# Patient Record
Sex: MALE | Race: BLACK OR AFRICAN AMERICAN | NOT HISPANIC OR LATINO | Employment: FULL TIME | URBAN - METROPOLITAN AREA
[De-identification: names, ages, dates, MRNs, and addresses within clinical notes are randomized per-mention and may not be internally consistent; named-entity substitution may affect disease eponyms.]

---

## 2017-05-28 ENCOUNTER — HOSPITAL ENCOUNTER (EMERGENCY)
Facility: MEDICAL CENTER | Age: 27
End: 2017-05-28
Attending: EMERGENCY MEDICINE
Payer: MEDICAID

## 2017-05-28 ENCOUNTER — APPOINTMENT (OUTPATIENT)
Dept: RADIOLOGY | Facility: MEDICAL CENTER | Age: 27
End: 2017-05-28
Attending: EMERGENCY MEDICINE
Payer: MEDICAID

## 2017-05-28 VITALS
TEMPERATURE: 98 F | HEIGHT: 71 IN | HEART RATE: 87 BPM | DIASTOLIC BLOOD PRESSURE: 61 MMHG | WEIGHT: 170.86 LBS | SYSTOLIC BLOOD PRESSURE: 128 MMHG | BODY MASS INDEX: 23.92 KG/M2 | OXYGEN SATURATION: 99 % | RESPIRATION RATE: 16 BRPM

## 2017-05-28 DIAGNOSIS — S63.642A SPRAIN OF METACARPOPHALANGEAL (MCP) JOINT OF LEFT THUMB, INITIAL ENCOUNTER: ICD-10-CM

## 2017-05-28 PROCEDURE — 99284 EMERGENCY DEPT VISIT MOD MDM: CPT

## 2017-05-28 PROCEDURE — 700102 HCHG RX REV CODE 250 W/ 637 OVERRIDE(OP): Performed by: EMERGENCY MEDICINE

## 2017-05-28 PROCEDURE — A9270 NON-COVERED ITEM OR SERVICE: HCPCS | Performed by: EMERGENCY MEDICINE

## 2017-05-28 PROCEDURE — 73130 X-RAY EXAM OF HAND: CPT | Mod: LT

## 2017-05-28 RX ORDER — IBUPROFEN 600 MG/1
600 TABLET ORAL ONCE
Status: COMPLETED | OUTPATIENT
Start: 2017-05-28 | End: 2017-05-28

## 2017-05-28 RX ADMIN — IBUPROFEN 600 MG: 600 TABLET, FILM COATED ORAL at 10:09

## 2017-05-28 ASSESSMENT — PAIN SCALES - GENERAL: PAINLEVEL_OUTOF10: 7

## 2017-05-28 ASSESSMENT — LIFESTYLE VARIABLES: DO YOU DRINK ALCOHOL: NO

## 2017-05-28 NOTE — ED AVS SNAPSHOT
Home Care Instructions                                                                                                                Alejo Valdivia   MRN: [id number]    Department:  [facility], Emergency Dept   Date of Visit:  5/28/2017            [facility], Emergency Dept    [address] [place] [id number]    Phone:  [phone]      You were seen by     Thomas Moreno M.D.      Your Diagnosis Was     Sprain of metacarpophalangeal (MCP) joint of left thumb, initial encounter     J75.596Z       These are the medications you received during your hospitalization from 05/28/2017 0936 to 05/28/2017 1115     Date/Time Order Dose Route Action    05/28/2017 1009 ibuprofen (MOTRIN) tablet 600 mg 600 mg Oral Given      Follow-up Information     1. Follow up with [facility], Emergency Dept.    Specialty:  Emergency Medicine    Why:  If symptoms worsen    Contact information    [address] [place] [address]  [place] [address] [phone]        2. Follow up with [facility].    Contact information    [place] [address] [phone]      Medication Information     Review all of your home medications and newly ordered medications with your primary doctor and/or pharmacist as soon as possible. Follow medication instructions as directed by your doctor and/or pharmacist.     Please keep your complete medication list with you and share with your physician. Update the information when medications are discontinued, doses are changed, or new medications (including over-the-counter products) are added; and carry medication information at all times in the event of emergency situations.               Medication List      Notice     You have not been prescribed any medications.            Procedures and tests performed during your visit     DX-HAND 3+ LEFT        Discharge Instructions       Please follow-up with your  primary care provider for blood pressure management.      Thumb Sprain  A thumb sprain is an injury to one of the strong bands of tissue (ligaments) that connect the bones in your thumb. The ligament can be stretched too much or it can tear. A tear can be either partial or complete. The severity of the sprain depends on how much of the ligament was damaged or torn.  CAUSES  A thumb sprain is often caused by a fall or an accident. If you extend your hands to catch an object or to protect yourself, the force of the impact can cause your ligament to stretch too much. This excess tension can also cause your ligament to tear.  RISK FACTORS  This injury is more likely to occur in people who play:  · Sports that involve a greater risk of falling, such as skiing.  · Sports that involve catching an object, such as basketball.  SYMPTOMS  Symptoms of this condition include:  · Loss of motion in your thumb.  · Bruising.  · Tenderness.  · Swelling.  DIAGNOSIS  This condition is diagnosed with a medical history and physical exam. You may also have an X-ray of your thumb.  TREATMENT  Treatment varies depending on the severity of your sprain. If your ligament is overstretched or partially torn, treatment usually involves keeping your thumb in a fixed position (immobilization) for a period of time. To help you do this, your health care provider will apply a bandage, cast, or splint to keep your thumb from moving until it heals.  If your ligament is fully torn, you may need surgery to reconnect the ligament to the bone. After surgery, a cast or splint will be applied and will need to stay on your thumb while it heals.  Your health care provider may also suggest exercises or physical therapy to strengthen your thumb.  HOME CARE INSTRUCTIONS  If You Have a Cast:  · Do not stick anything inside the cast to scratch your skin. Doing that increases your risk of infection.  · Check the skin around the cast every day. Report any concerns to  your health care provider. You may put lotion on dry skin around the edges of the cast. Do not apply lotion to the skin underneath the cast.  · Keep the cast clean and dry.  If You Have a Splint:  · Wear it as directed by your health care provider. Remove it only as directed by your health care provider.  · Loosen the splint if your fingers become numb and tingle, or if they turn cold and blue.  · Keep the splint clean and dry.  Bathing  · Cover the bandage, cast, or splint with a watertight plastic bag to protect it from water while you take a bath or a shower. Do not let the bandage, cast, or splint get wet.  Managing Pain, Stiffness, and Swelling   · If directed, apply ice to the injured area (unless you have a cast):  ¨ Put ice in a plastic bag.  ¨ Place a towel between your skin and the bag.  ¨ Leave the ice on for 20 minutes, 2-3 times per day.  · Move your fingers often to avoid stiffness and to lessen swelling.  · Raise (elevate) the injured area above the level of your heart while you are sitting or lying down.  Driving  · Do not drive or operate heavy machinery while taking pain medicine.  · Do not drive while wearing a cast or splint on a hand that you use for driving.  General Instructions  · Do not put pressure on any part of your cast or splint until it is fully hardened. This may take several hours.  · Take medicines only as directed by your health care provider. These include over-the-counter medicines and prescription medicines.  · Keep all follow-up visits as directed by your health care provider. This is important.  · Do any exercise or physical therapy as directed by your health care provider.  · Do not wear rings on your injured thumb.  SEEK MEDICAL CARE IF:  · Your pain is not controlled with medicine.  · Your bruising or swelling gets worse.  · Your cast or splint is damaged.  SEEK IMMEDIATE MEDICAL CARE IF:  · Your thumb is numb or blue.  · Your thumb feels colder than normal.     This  information is not intended to replace advice given to you by your health care provider. Make sure you discuss any questions you have with your health care provider.     Document Released: 01/25/2006 Document Revised: 05/03/2016 Document Reviewed: 09/29/2015  Elsevier Interactive Patient Education ©2016 [employer]            Patient Information     Patient Information    Following emergency treatment: all patient requiring follow-up care must return either to a private physician or a clinic if your condition worsens before you are able to obtain further medical attention, please return to the emergency room.     Billing Information    At [facility], we work to make the billing process streamlined for our patients.  Our Representatives are here to answer any questions you may have regarding your hospital bill.  If you have insurance coverage and have supplied your insurance information to us, we will submit a claim to your insurer on your behalf.  Should you have any questions regarding your bill, we can be reached online or by phone as follows:  Online: You are able pay your bills online or live chat with our representatives about any billing questions you may have. We are here to help Monday - Friday from 8:00am to 7:30pm and 9:00am - 12:00pm on Saturdays.  Please visit [email or web address]  for more information.   Phone:  [phone] or [phone]    Please note that your emergency physician, surgeon, pathologist, radiologist, anesthesiologist, and other specialists are not employed by [facility] and will therefore bill separately for their services.  Please contact them directly for any questions concerning their bills at the numbers below:     Emergency Physician Services:  [phone]  [facility]:  [phone]  [facility]:  [phone]  [facility]:  [phone]    1. Your final bill may vary from the amount quoted  upon discharge if all procedures are not complete at that time, or if your doctor has additional procedures of which we are not aware. You will receive an additional bill if you return to the Emergency Department at [facility] for suture removal regardless of the facility of which the sutures were placed.     2. Please arrange for settlement of this account at the emergency registration.    3. All self-pay accounts are due in full at the time of treatment.  If you are unable to meet this obligation then payment is expected within 4-5 days.     4. If you have had radiology studies (CT, X-ray, Ultrasound, MRI), you have received a preliminary result during your emergency department visit. Please contact the radiology department [phone] to receive a copy of your final result. Please discuss the Final result with your primary physician or with the follow up physician provided.     Crisis Hotline:  [place] Crisis Hotline:  6-011-DCBBMAM or 1-935.731.4615  [place] Crisis Hotline:    1-451.587.5108 or 010-382-7240         ED Discharge Follow Up Questions    1. In order to provide you with very good care, we would like to follow up with a phone call in the next few days.  May we have your permission to contact you?     YES /  NO    2. What is the best phone number to call you? (       )_____-__________    3. What is the best time to call you?      Morning  /  Afternoon  /  Evening                   Patient Signature:  ____________________________________________________________    Date:  ____________________________________________________________

## 2017-05-28 NOTE — ED AVS SNAPSHOT
[employer] Access Code: [email or web address]  Expires: 6/27/2017 11:14 AM    [employer]  A secure, online tool to manage your health information     "[employer]’s [employer]® is a secure, online tool that connects you to your personalized health information from the privacy of your home -- day or night - making it very easy for you to manage your healthcare. Once the activation process is completed, you can even access your medical information using the [employer] red, which is available for free in the Apple Red store or Google Play store.     [employer] provides the following levels of access (as shown below):   My Chart Features   [facility] Primary Care Doctor [facility]  Specialists [facility]  Urgent  Care Non-[facility]  Primary Care  Doctor   Email your healthcare team securely and privately 24/7 X X X X   Manage appointments: schedule your next appointment; view details of past/upcoming appointments X      Request prescription refills. X      View recent personal medical records, including lab and immunizations X X X X   View health record, including health history, allergies, medications X X X X   Read reports about your outpatient visits, procedures, consult and ER notes X X X X   See your discharge summary, which is a recap of your hospital and/or ER visit that includes your diagnosis, lab results, and care plan. X X       How to register for [employer]:  1. Go to  [email or web address].  2. Click on the Sign Up Now box, which takes you to the New Member Sign Up page. You will need to provide the following information:  a. Enter your [employer] Access Code exactly as it appears at the top of this page. (You will not need to use this code after you’ve completed the sign-up process. If you do not sign up before the expiration date, you must request a new code.)   b. Enter your date of birth.   c. Enter your home email address.   d. Click Submit, and follow the next screen’s instructions.  3. Create a [employer] ID. This will be your [employer]  login ID and cannot be changed, so think of one that is secure and easy to remember.  4. Create a [employer] password. You can change your password at any time.  5. Enter your Password Reset Question and Answer. This can be used at a later time if you forget your password.   6. Enter your e-mail address. This allows you to receive e-mail notifications when new information is available in [employer].  7. Click Sign Up. You can now view your health information.    For assistance activating your [employer] account, call [phone]

## 2017-05-28 NOTE — ED AVS SNAPSHOT
5/28/2017    Alejo Valdivia  [address]  [place] [address]    Dear Alejo:    [facility] wants to ensure your discharge home is safe and you or your loved ones have had all of your questions answered regarding your care after you leave the hospital.    Below is a list of resources and contact information should you have any questions regarding your hospital stay, follow-up instructions, or active medical symptoms.    Questions or Concerns Regarding… Contact   Medical Questions Related to Your Discharge  (7 days a week, 8am-5pm) Contact a Nurse Care Coordinator   [phone]   Medical Questions Not Related to Your Discharge  (24 hours a day / 7 days a week)  Contact the Nurse Health Line   [phone]    Medications or Discharge Instructions Refer to your discharge packet   or contact your [facility] Primary Care Provider   [phone]   Follow-up Appointment(s) Schedule your appointment via [facility]   or contact Scheduling [phone]   Billing Review your statement via [employer]  or contact Billing [phone]   Medical Records Review your records via [employer]   or contact Medical Records [phone]     You may receive a telephone call within two days of discharge. This call is to make certain you understand your discharge instructions and have the opportunity to have any questions answered. You can also easily access your medical information, test results and upcoming appointments via the [employer] free online health management tool. You can learn more and sign up at [employer]. For assistance setting up your [employer] account, please call [phone].    Once again, we want to ensure your discharge home is safe and that you have a clear understanding of any next steps in your care. If you have any questions or concerns, please do not hesitate to contact us, we are here for you. Thank you for choosing [facility] for your healthcare needs.    Sincerely,    Your [facility] Healthcare Team

## 2017-05-28 NOTE — DISCHARGE INSTRUCTIONS
Please follow-up with your primary care provider for blood pressure management.      Thumb Sprain  A thumb sprain is an injury to one of the strong bands of tissue (ligaments) that connect the bones in your thumb. The ligament can be stretched too much or it can tear. A tear can be either partial or complete. The severity of the sprain depends on how much of the ligament was damaged or torn.  CAUSES  A thumb sprain is often caused by a fall or an accident. If you extend your hands to catch an object or to protect yourself, the force of the impact can cause your ligament to stretch too much. This excess tension can also cause your ligament to tear.  RISK FACTORS  This injury is more likely to occur in people who play:  · Sports that involve a greater risk of falling, such as skiing.  · Sports that involve catching an object, such as basketball.  SYMPTOMS  Symptoms of this condition include:  · Loss of motion in your thumb.  · Bruising.  · Tenderness.  · Swelling.  DIAGNOSIS  This condition is diagnosed with a medical history and physical exam. You may also have an X-ray of your thumb.  TREATMENT  Treatment varies depending on the severity of your sprain. If your ligament is overstretched or partially torn, treatment usually involves keeping your thumb in a fixed position (immobilization) for a period of time. To help you do this, your health care provider will apply a bandage, cast, or splint to keep your thumb from moving until it heals.  If your ligament is fully torn, you may need surgery to reconnect the ligament to the bone. After surgery, a cast or splint will be applied and will need to stay on your thumb while it heals.  Your health care provider may also suggest exercises or physical therapy to strengthen your thumb.  HOME CARE INSTRUCTIONS  If You Have a Cast:  · Do not stick anything inside the cast to scratch your skin. Doing that increases your risk of infection.  · Check the skin around the cast every  day. Report any concerns to your health care provider. You may put lotion on dry skin around the edges of the cast. Do not apply lotion to the skin underneath the cast.  · Keep the cast clean and dry.  If You Have a Splint:  · Wear it as directed by your health care provider. Remove it only as directed by your health care provider.  · Loosen the splint if your fingers become numb and tingle, or if they turn cold and blue.  · Keep the splint clean and dry.  Bathing  · Cover the bandage, cast, or splint with a watertight plastic bag to protect it from water while you take a bath or a shower. Do not let the bandage, cast, or splint get wet.  Managing Pain, Stiffness, and Swelling   · If directed, apply ice to the injured area (unless you have a cast):  ¨ Put ice in a plastic bag.  ¨ Place a towel between your skin and the bag.  ¨ Leave the ice on for 20 minutes, 2-3 times per day.  · Move your fingers often to avoid stiffness and to lessen swelling.  · Raise (elevate) the injured area above the level of your heart while you are sitting or lying down.  Driving  · Do not drive or operate heavy machinery while taking pain medicine.  · Do not drive while wearing a cast or splint on a hand that you use for driving.  General Instructions  · Do not put pressure on any part of your cast or splint until it is fully hardened. This may take several hours.  · Take medicines only as directed by your health care provider. These include over-the-counter medicines and prescription medicines.  · Keep all follow-up visits as directed by your health care provider. This is important.  · Do any exercise or physical therapy as directed by your health care provider.  · Do not wear rings on your injured thumb.  SEEK MEDICAL CARE IF:  · Your pain is not controlled with medicine.  · Your bruising or swelling gets worse.  · Your cast or splint is damaged.  SEEK IMMEDIATE MEDICAL CARE IF:  · Your thumb is numb or blue.  · Your thumb feels colder  than normal.     This information is not intended to replace advice given to you by your health care provider. Make sure you discuss any questions you have with your health care provider.     Document Released: 01/25/2006 Document Revised: 05/03/2016 Document Reviewed: 09/29/2015  Elsevier Interactive Patient Education ©2016 Else[employer] Inc.

## 2017-05-28 NOTE — ED PROVIDER NOTES
"ED Provider Note    Scribed for Thomas Moreno M.D. by Chely Rivero. 5/28/2017  9:49 AM    Primary care provider: None  Means of arrival: Walk in  History obtained from: Patient  History limited by: None    CHIEF COMPLAINT  Chief Complaint   Patient presents with   • Digit Pain       HPI  Alejo Valdivia is a 26 y.o. male who presents to the Emergency Department for digit pain with an onset of 1 day.  Patient was playing basketball yesterday afternoon when he fell and sustained an injury to his left thumb. He complains of constant, left thumb pain which is associated with swelling. Icy hot and wrapping his thumb did not resolve his symptoms.  He denies any additional injuries or numbness to his left hand.      REVIEW OF SYSTEMS  Pertinent positives include left digit injury, left thumb pain and swelling. Pertinent negatives include no additional injuries or numbness to left hand.  See HPI for further details.  E.      PAST MEDICAL HISTORY  Patient denies previous digit injuries.      SURGICAL HISTORY  Patient denies a pertinent surgical history.       SOCIAL HISTORY  Social History   Substance Use Topics   • Smoking status: Former Smoker     Types: Cigarettes     Quit date: 01/31/2015   • Alcohol Use: No      History   Drug Use No       FAMILY HISTORY  History reviewed.  No pertinent family history.      CURRENT MEDICATIONS  Home Medications     Reviewed by Mackenzie Dc R.N. (Registered Nurse) on 05/28/17 at 0949  Med List Status: Complete    Medication Last Dose Status          Patient Fidel Taking any Medications                        ALLERGIES  Allergies   Allergen Reactions   • Citrus Hives       PHYSICAL EXAM  VITAL SIGNS: /67 mmHg  Pulse 96  Temp(Src) 37.4 °C (99.4 °F)  Resp 16  Ht 1.803 m (5' 11\")  Wt 77.5 kg (170 lb 13.7 oz)  BMI 23.84 kg/m2  SpO2 99%      Constitutional: Well developed, Well nourished, No distress, Non-toxic appearance.   HENT: Normocephalic, Atraumatic.  " "Oropharynx moist.   Eyes: PERRL, EOMI, Conjunctiva normal, No discharge.   CV: Good pulses  Thorax & Lungs: No respiratory distress.   Skin: Warm, Dry, No erythema, No rash.    Musculoskeletal: Large amount of soft tissue swelling and pain to left first metacarpal.  Neurologic: Awake, alert. Moves all extremities spontaneously.  Psychiatric: Affect normal, Mood normal.      RADIOLOGY  DX-HAND 3+ LEFT   Final Result      Tiny linear ossific fragment adjacent to the base of the first proximal phalanx likely represents an avulsion fracture.        The radiologist's interpretation of all radiological studies have been reviewed by me.      COURSE & MEDICAL DECISION MAKING  Pertinent Labs & Imaging studies reviewed. (See chart for details)    9:58 AM Patient seen and examined at bedside. Patient presents for digit pain.    Initial orders in the Emergency Department included XR left hand. Initial treatment in the Emergency Department included 600 mg of Motrin PO for pain.  Patient verbalized their understanding and agreement to this plan.    11:05 AM On repeat evaluation, patient is doing well. XR results were discussed as noted above.  Aluminum left thumb splint will be applied. Patient was asked to follow up with [facility] for persistent pain as further evaluation and imaging may be necessary.    Tylenol and Motrin are recommended for pain.  The patient verbalizes understanding and will comply.  Patient is stable at the time of discharge.  Vital signs were reviewed: /61 mmHg  Pulse 87  Temp(Src) 36.7 °C (98 °F)  Resp 16  Ht 1.803 m (5' 11\")  Wt 77.5 kg (170 lb 13.7 oz)  BMI 23.84 kg/m2  SpO2 99%        Decision Making:  Thumb injury, x-ray shows questionable avulsion injury, but the patient had an AlumaFoam splint, have the patient follow-up with Dr. Chapman as an outpatient.        DISPOSITION  Patient will be discharged home in stable condition.      FOLLOW UP  [facility]" [place], Emergency Dept  [address]  [place] [id number] [phone]    If symptoms worsen    [facility]  [address] [place] [id number] [phone]          The patient is referred to a primary physician for blood pressure management, diabetic screening, and for all other preventative health concerns.      DIAGNOSIS  1. Sprain of metacarpophalangeal (MCP) joint of left thumb, initial encounter           The note accurately reflects work and decisions made by me.  Thomas Moreno  5/28/2017  3:38 PM     IChely (Scribe), am scribing for, and in the presence of, Thomas Moreno M.D.    Electronically signed by: Chely Rivero (Torinibchristianne), 5/28/2017    Thomas SCHAFER M.D. personally performed the services described in this documentation, as scribed by Chely Rivero in my presence, and it is both accurate and complete.

## 2017-05-28 NOTE — ED NOTES
Pt ambulates to triage  Chief Complaint   Patient presents with   • Hand Injury     fell onto L thumb yesterday   Pt asked to wait in lobby, pt updated on triage process and pt asked to inform RN of any changes.

## 2017-12-06 ENCOUNTER — APPOINTMENT (OUTPATIENT)
Dept: RADIOLOGY | Facility: MEDICAL CENTER | Age: 27
End: 2017-12-06
Attending: EMERGENCY MEDICINE

## 2017-12-06 ENCOUNTER — HOSPITAL ENCOUNTER (EMERGENCY)
Facility: MEDICAL CENTER | Age: 27
End: 2017-12-07
Attending: EMERGENCY MEDICINE

## 2017-12-06 DIAGNOSIS — J06.9 UPPER RESPIRATORY TRACT INFECTION, UNSPECIFIED TYPE: ICD-10-CM

## 2017-12-06 DIAGNOSIS — R50.9 FEVER, UNSPECIFIED FEVER CAUSE: ICD-10-CM

## 2017-12-06 LAB
ALBUMIN SERPL BCP-MCNC: 4.5 G/DL (ref 3.2–4.9)
ALBUMIN/GLOB SERPL: 1.6 G/DL
ALP SERPL-CCNC: 59 U/L (ref 30–99)
ALT SERPL-CCNC: 12 U/L (ref 2–50)
ANION GAP SERPL CALC-SCNC: 10 MMOL/L (ref 0–11.9)
AST SERPL-CCNC: 20 U/L (ref 12–45)
BASOPHILS # BLD AUTO: 0.4 % (ref 0–1.8)
BASOPHILS # BLD: 0.03 K/UL (ref 0–0.12)
BILIRUB SERPL-MCNC: 0.5 MG/DL (ref 0.1–1.5)
BLOOD CULTURE HOLD CXBCH: NORMAL
BUN SERPL-MCNC: 7 MG/DL (ref 8–22)
CALCIUM SERPL-MCNC: 10.3 MG/DL (ref 8.5–10.5)
CHLORIDE SERPL-SCNC: 104 MMOL/L (ref 96–112)
CO2 SERPL-SCNC: 24 MMOL/L (ref 20–33)
CREAT SERPL-MCNC: 1.16 MG/DL (ref 0.5–1.4)
DEPRECATED S PYO AG THROAT QL EIA: NORMAL
EOSINOPHIL # BLD AUTO: 0.26 K/UL (ref 0–0.51)
EOSINOPHIL NFR BLD: 3.3 % (ref 0–6.9)
ERYTHROCYTE [DISTWIDTH] IN BLOOD BY AUTOMATED COUNT: 43 FL (ref 35.9–50)
FLUAV RNA SPEC QL NAA+PROBE: NEGATIVE
FLUBV RNA SPEC QL NAA+PROBE: NEGATIVE
GFR SERPL CREATININE-BSD FRML MDRD: >60 ML/MIN/1.73 M 2
GLOBULIN SER CALC-MCNC: 2.9 G/DL (ref 1.9–3.5)
GLUCOSE SERPL-MCNC: 88 MG/DL (ref 65–99)
HCT VFR BLD AUTO: 48.5 % (ref 42–52)
HGB BLD-MCNC: 16.5 G/DL (ref 14–18)
IMM GRANULOCYTES # BLD AUTO: 0.02 K/UL (ref 0–0.11)
IMM GRANULOCYTES NFR BLD AUTO: 0.3 % (ref 0–0.9)
LIPASE SERPL-CCNC: 15 U/L (ref 11–82)
LYMPHOCYTES # BLD AUTO: 1.22 K/UL (ref 1–4.8)
LYMPHOCYTES NFR BLD: 15.3 % (ref 22–41)
MCH RBC QN AUTO: 31 PG (ref 27–33)
MCHC RBC AUTO-ENTMCNC: 34 G/DL (ref 33.7–35.3)
MCV RBC AUTO: 91 FL (ref 81.4–97.8)
MONOCYTES # BLD AUTO: 0.6 K/UL (ref 0–0.85)
MONOCYTES NFR BLD AUTO: 7.5 % (ref 0–13.4)
NEUTROPHILS # BLD AUTO: 5.85 K/UL (ref 1.82–7.42)
NEUTROPHILS NFR BLD: 73.2 % (ref 44–72)
NRBC # BLD AUTO: 0 K/UL
NRBC BLD AUTO-RTO: 0 /100 WBC
PLATELET # BLD AUTO: 200 K/UL (ref 164–446)
PMV BLD AUTO: 10 FL (ref 9–12.9)
POTASSIUM SERPL-SCNC: 3.8 MMOL/L (ref 3.6–5.5)
PROT SERPL-MCNC: 7.4 G/DL (ref 6–8.2)
RBC # BLD AUTO: 5.33 M/UL (ref 4.7–6.1)
SIGNIFICANT IND 70042: NORMAL
SITE SITE: NORMAL
SODIUM SERPL-SCNC: 138 MMOL/L (ref 135–145)
SOURCE SOURCE: NORMAL
WBC # BLD AUTO: 8 K/UL (ref 4.8–10.8)

## 2017-12-06 PROCEDURE — 87502 INFLUENZA DNA AMP PROBE: CPT

## 2017-12-06 PROCEDURE — 700102 HCHG RX REV CODE 250 W/ 637 OVERRIDE(OP): Performed by: EMERGENCY MEDICINE

## 2017-12-06 PROCEDURE — 85025 COMPLETE CBC W/AUTO DIFF WBC: CPT

## 2017-12-06 PROCEDURE — 700105 HCHG RX REV CODE 258: Performed by: EMERGENCY MEDICINE

## 2017-12-06 PROCEDURE — 87880 STREP A ASSAY W/OPTIC: CPT

## 2017-12-06 PROCEDURE — 99284 EMERGENCY DEPT VISIT MOD MDM: CPT

## 2017-12-06 PROCEDURE — 87081 CULTURE SCREEN ONLY: CPT

## 2017-12-06 PROCEDURE — 700111 HCHG RX REV CODE 636 W/ 250 OVERRIDE (IP): Performed by: EMERGENCY MEDICINE

## 2017-12-06 PROCEDURE — 36415 COLL VENOUS BLD VENIPUNCTURE: CPT

## 2017-12-06 PROCEDURE — 80053 COMPREHEN METABOLIC PANEL: CPT

## 2017-12-06 PROCEDURE — 83690 ASSAY OF LIPASE: CPT

## 2017-12-06 PROCEDURE — 96361 HYDRATE IV INFUSION ADD-ON: CPT

## 2017-12-06 PROCEDURE — 96374 THER/PROPH/DIAG INJ IV PUSH: CPT

## 2017-12-06 PROCEDURE — A9270 NON-COVERED ITEM OR SERVICE: HCPCS | Performed by: EMERGENCY MEDICINE

## 2017-12-06 RX ORDER — KETOROLAC TROMETHAMINE 30 MG/ML
15 INJECTION, SOLUTION INTRAMUSCULAR; INTRAVENOUS ONCE
Status: COMPLETED | OUTPATIENT
Start: 2017-12-06 | End: 2017-12-06

## 2017-12-06 RX ORDER — ACETAMINOPHEN 325 MG/1
650 TABLET ORAL ONCE
Status: COMPLETED | OUTPATIENT
Start: 2017-12-06 | End: 2017-12-06

## 2017-12-06 RX ORDER — SODIUM CHLORIDE 9 MG/ML
1000 INJECTION, SOLUTION INTRAVENOUS ONCE
Status: COMPLETED | OUTPATIENT
Start: 2017-12-06 | End: 2017-12-07

## 2017-12-06 RX ADMIN — ACETAMINOPHEN 650 MG: 325 TABLET, FILM COATED ORAL at 20:50

## 2017-12-06 RX ADMIN — KETOROLAC TROMETHAMINE 15 MG: 30 INJECTION, SOLUTION INTRAMUSCULAR at 23:46

## 2017-12-06 RX ADMIN — SODIUM CHLORIDE 1000 ML: 9 INJECTION, SOLUTION INTRAVENOUS at 23:45

## 2017-12-07 VITALS
BODY MASS INDEX: 24.88 KG/M2 | DIASTOLIC BLOOD PRESSURE: 75 MMHG | RESPIRATION RATE: 16 BRPM | HEART RATE: 77 BPM | SYSTOLIC BLOOD PRESSURE: 126 MMHG | TEMPERATURE: 99.6 F | WEIGHT: 177.69 LBS | HEIGHT: 71 IN | OXYGEN SATURATION: 95 %

## 2017-12-07 PROCEDURE — 71020 DX-CHEST-2 VIEWS: CPT

## 2017-12-07 ASSESSMENT — PAIN SCALES - GENERAL: PAINLEVEL_OUTOF10: 0

## 2017-12-07 NOTE — DISCHARGE INSTRUCTIONS
"Fever   Fever is a higher-than-normal body temperature. A normal temperature varies with:  · Age.   · How it is measured (mouth, underarm, rectal, or ear).   · Time of day.   In an adult, an oral temperature around 98.6° Fahrenheit (F) or 37° Celsius (C) is considered normal. A rise in temperature of about 1.8° F or 1° C is generally considered a fever (100.4° F or 38° C). In an infant age 28 days or less, a rectal temperature of 100.4° F (38° C) generally is regarded as fever. Fever is not a disease but can be a symptom of illness.  CAUSES   · Fever is most commonly caused by infection.   · Some non-infectious problems can cause fever. For example:   · Some arthritis problems.   · Problems with the thyroid or adrenal glands.   · Immune system problems.   · Some kinds of cancer.   · A reaction to certain medicines.   · Occasionally, the source of a fever cannot be determined. This is sometimes called a \"Fever of Unknown Origin\" (FUO).   · Some situations may lead to a temporary rise in body temperature that may go away on its own. Examples are:   · Childbirth.   · Surgery.   · Some situations may cause a rise in body temperature but these are not considered \"true fever\". Examples are:   · Intense exercise.   · Dehydration.   · Exposure to high outside or room temperatures.   SYMPTOMS   · Feeling warm or hot.   · Fatigue or feeling exhausted.   · Aching all over.   · Chills.   · Shivering.   · Sweats.   DIAGNOSIS   A fever can be suspected by your caregiver feeling that your skin is unusually warm. The fever is confirmed by taking a temperature with a thermometer. Temperatures can be taken different ways. Some methods are accurate and some are not:  With adults, adolescents, and children:   · An oral temperature is used most commonly.   · An ear thermometer will only be accurate if it is positioned as recommended by the .   · Under the arm temperatures are not accurate and not recommended.   · Most " electronic thermometers are fast and accurate.   Infants and Toddlers:  · Rectal temperatures are recommended and most accurate.   · Ear temperatures are not accurate in this age group and are not recommended.   · Skin thermometers are not accurate.   RISKS AND COMPLICATIONS   · During a fever, the body uses more oxygen, so a person with a fever may develop rapid breathing or shortness of breath. This can be dangerous especially in people with heart or lung disease.   · The sweats that occur following a fever can cause dehydration.   · High fever can cause seizures in infants and children.   · Older persons can develop confusion during a fever.   TREATMENT   · Medications may be used to control temperature.   · Do not give aspirin to children with fevers. There is an association with Reye's syndrome. Reye's syndrome is a rare but potentially deadly disease.   · If an infection is present and medications have been prescribed, take them as directed. Finish the full course of medications until they are gone.   · Sponging or bathing with room-temperature water may help reduce body temperature. Do not use ice water or alcohol sponge baths.   · Do not over-bundle children in blankets or heavy clothes.   · Drinking adequate fluids during an illness with fever is important to prevent dehydration.   HOME CARE INSTRUCTIONS   · For adults, rest and adequate fluid intake are important. Dress according to how you feel, but do not over-bundle.   · Drink enough water and/or fluids to keep your urine clear or pale yellow.   · For infants over 3 months and children, giving medication as directed by your caregiver to control fever can help with comfort. The amount to be given is based on the child's weight. Do NOT give more than is recommended.   SEEK MEDICAL CARE IF:   · You or your child are unable to keep fluids down.   · Vomiting or diarrhea develops.   · You develop a skin rash.   · An oral temperature above 102° F (38.9° C)  "develops, or a fever which persists for over 3 days.   · You develop excessive weakness, dizziness, fainting or extreme thirst.   · Fevers keep coming back after 3 days.   SEEK IMMEDIATE MEDICAL CARE IF:   · Shortness of breath or trouble breathing develops   · You pass out.   · You feel you are making little or no urine.   · New pain develops that was not there before (such as in the head, neck, chest, back, or abdomen).   · You cannot hold down fluids.   · Vomiting and diarrhea persist for more than a day or two.   · You develop a stiff neck and/or your eyes become sensitive to light.   · An unexplained temperature above 102° F (38.9° C) develops.   Document Released: 12/18/2006 Document Revised: 03/11/2013 Document Reviewed: 12/03/2009  [employer]® Patient Information ©2013 [employer].    Upper Respiratory Infection, Adult  Most upper respiratory infections (URIs) are a viral infection of the air passages leading to the lungs. A URI affects the nose, throat, and upper air passages. The most common type of URI is nasopharyngitis and is typically referred to as \"the common cold.\"  URIs run their course and usually go away on their own. Most of the time, a URI does not require medical attention, but sometimes a bacterial infection in the upper airways can follow a viral infection. This is called a secondary infection. Sinus and middle ear infections are common types of secondary upper respiratory infections.  Bacterial pneumonia can also complicate a URI. A URI can worsen asthma and chronic obstructive pulmonary disease (COPD). Sometimes, these complications can require emergency medical care and may be life threatening.   CAUSES  Almost all URIs are caused by viruses. A virus is a type of germ and can spread from one person to another.   RISKS FACTORS  You may be at risk for a URI if:   · You smoke.    · You have chronic heart or lung disease.  · You have a weakened defense (immune) system.    · You are very young " or very old.    · You have nasal allergies or asthma.  · You work in crowded or poorly ventilated areas.  · You work in health care facilities or schools.  SIGNS AND SYMPTOMS   Symptoms typically develop 2-3 days after you come in contact with a cold virus. Most viral URIs last 7-10 days. However, viral URIs from the influenza virus (flu virus) can last 14-18 days and are typically more severe. Symptoms may include:   · Runny or stuffy (congested) nose.    · Sneezing.    · Cough.    · Sore throat.    · Headache.    · Fatigue.    · Fever.    · Loss of appetite.    · Pain in your forehead, behind your eyes, and over your cheekbones (sinus pain).  · Muscle aches.    DIAGNOSIS   Your health care provider may diagnose a URI by:  · Physical exam.  · Tests to check that your symptoms are not due to another condition such as:  ¨ Strep throat.  ¨ Sinusitis.  ¨ Pneumonia.  ¨ Asthma.  TREATMENT   A URI goes away on its own with time. It cannot be cured with medicines, but medicines may be prescribed or recommended to relieve symptoms. Medicines may help:  · Reduce your fever.  · Reduce your cough.  · Relieve nasal congestion.  HOME CARE INSTRUCTIONS   · Take medicines only as directed by your health care provider.    · Gargle warm saltwater or take cough drops to comfort your throat as directed by your health care provider.  · Use a warm mist humidifier or inhale steam from a shower to increase air moisture. This may make it easier to breathe.  · Drink enough fluid to keep your urine clear or pale yellow.    · Eat soups and other clear broths and maintain good nutrition.    · Rest as needed.    · Return to work when your temperature has returned to normal or as your health care provider advises. You may need to stay home longer to avoid infecting others. You can also use a face mask and careful hand washing to prevent spread of the virus.  · Increase the usage of your inhaler if you have asthma.    · Do not use any tobacco  products, including cigarettes, chewing tobacco, or electronic cigarettes. If you need help quitting, ask your health care provider.  PREVENTION   The best way to protect yourself from getting a cold is to practice good hygiene.   · Avoid oral or hand contact with people with cold symptoms.    · Wash your hands often if contact occurs.    There is no clear evidence that vitamin C, vitamin E, echinacea, or exercise reduces the chance of developing a cold. However, it is always recommended to get plenty of rest, exercise, and practice good nutrition.   SEEK MEDICAL CARE IF:   · You are getting worse rather than better.    · Your symptoms are not controlled by medicine.    · You have chills.  · You have worsening shortness of breath.  · You have brown or red mucus.  · You have yellow or brown nasal discharge.  · You have pain in your face, especially when you bend forward.  · You have a fever.  · You have swollen neck glands.  · You have pain while swallowing.  · You have white areas in the back of your throat.  SEEK IMMEDIATE MEDICAL CARE IF:   · You have severe or persistent:  ¨ Headache.  ¨ Ear pain.  ¨ Sinus pain.  ¨ Chest pain.  · You have chronic lung disease and any of the following:  ¨ Wheezing.  ¨ Prolonged cough.  ¨ Coughing up blood.  ¨ A change in your usual mucus.  · You have a stiff neck.  · You have changes in your:  ¨ Vision.  ¨ Hearing.  ¨ Thinking.  ¨ Mood.  MAKE SURE YOU:   · Understand these instructions.  · Will watch your condition.  · Will get help right away if you are not doing well or get worse.     This information is not intended to replace advice given to you by your health care provider. Make sure you discuss any questions you have with your health care provider.     Document Released: 06/13/2002 Document Revised: 05/03/2016 Document Reviewed: 03/25/2015  [employer] Patient Education ©2016 [employer]

## 2017-12-07 NOTE — ED NOTES
Pt discharged home. Explained discharge instructions. Questions and comments addressed. Pt verbalized understanding of instructions. Pt advised to return to ED for any new or worsening of symptoms. Pt is ambulating well and steady on feet. VS stable. Pt's ambulatory to ED lobby now.

## 2017-12-07 NOTE — ED PROVIDER NOTES
"ED Provider Note    Scribed for Daja Haines M.D. by Elmer Sanchez. 12/6/2017, 10:35 PM.    Primary care provider: Pcp Pt States None  Means of arrival: walking  History obtained from: Patient    CHIEF COMPLAINT  Chief Complaint   Patient presents with   • Diarrhea     x 3 days   • Cough   • Nausea   • Fever   • Sore Throat     HPI  Alejo Valdivia is a 27 y.o. male who presents to the Emergency Department with flu like symptoms onset two days ago. The patient complains of an onset of flu like symptoms including cough, fever, sore throat, and diarrhea. He reports progressive generalized weakness associated with these flu symptoms. The patient measured his temperature to be as high as 103 degrees orally three hours prior to his arrival in the ED. He has been medicating his symptoms with Theraflu. He has been taking care of wife who is ill with similar symptoms. The patient denies that he has been having any vomiting, headache, vision changes.   He denies any other significant history, is otherwise healthy.     REVIEW OF SYSTEMS  See HPI for further details. All other systems are negative.     PAST MEDICAL HISTORY  Denies chronic past medical history.     SURGICAL HISTORY  patient denies any surgical history    SOCIAL HISTORY  Social History   Substance Use Topics   • Smoking status: Former Smoker     Types: Cigarettes     Quit date: 1/31/2015   • Smokeless tobacco: Never Used   • Alcohol use No      History   Drug Use No     FAMILY HISTORY  History reviewed. No pertinent family history.    CURRENT MEDICATIONS  Reviewed.  See Encounter Summary.     ALLERGIES  Allergies   Allergen Reactions   • Citrus Hives     PHYSICAL EXAM  VITAL SIGNS: /75   Pulse 97   Temp (!) 38.1 °C (100.5 °F) (Temporal)   Resp 18   Ht 1.803 m (5' 11\")   Wt 80.6 kg (177 lb 11.1 oz)   SpO2 97%   BMI 24.78 kg/m²   Constitutional: Alert in no apparent distress.  HENT: No signs of trauma, Bilateral external ears normal, Nose " normal.   Eyes: Pupils are equal and reactive, Conjunctiva normal, Non-icteric.   Neck: Normal range of motion, No tenderness, Supple, No stridor.   Lymphatic: No lymphadenopathy noted.   Cardiovascular: Regular rate and rhythm, no murmurs.   Thorax & Lungs: Normal breath sounds, No respiratory distress, No wheezing, No chest tenderness.   Abdomen: Bowel sounds normal, Soft, No tenderness, No masses, No pulsatile masses. No peritoneal signs.  Skin: Warm, Dry, No erythema, No rash.   Extremities: Intact distal pulses, No edema, No tenderness, No cyanosis  Musculoskeletal: Good range of motion in all major joints. No tenderness to palpation or major deformities noted.   Neurologic: Alert , Normal motor function, Normal sensory function, No focal deficits noted.   Psychiatric: Affect normal, Judgment normal, Mood normal.     DIAGNOSTIC STUDIES / PROCEDURES     LABS  Results for orders placed or performed during the hospital encounter of 12/06/17   RAPID STREP, CULT IF INDICATED (CULTURE IF NEGATIVE)   Result Value Ref Range    Significant Indicator NEG     Source THRT     Site THROAT     Rapid Strep Screen       Negative for Group A streptococcus.  A negative result may be obtained if the specimen is  inadequate or antigen concentration is below the  sensitivity of the test. This negative test will be followed  up with a culture as requested.     CBC WITH DIFFERENTIAL   Result Value Ref Range    WBC 8.0 4.8 - 10.8 K/uL    RBC 5.33 4.70 - 6.10 M/uL    Hemoglobin 16.5 14.0 - 18.0 g/dL    Hematocrit 48.5 42.0 - 52.0 %    MCV 91.0 81.4 - 97.8 fL    MCH 31.0 27.0 - 33.0 pg    MCHC 34.0 33.7 - 35.3 g/dL    RDW 43.0 35.9 - 50.0 fL    Platelet Count 200 164 - 446 K/uL    MPV 10.0 9.0 - 12.9 fL    Neutrophils-Polys 73.20 (H) 44.00 - 72.00 %    Lymphocytes 15.30 (L) 22.00 - 41.00 %    Monocytes 7.50 0.00 - 13.40 %    Eosinophils 3.30 0.00 - 6.90 %    Basophils 0.40 0.00 - 1.80 %    Immature Granulocytes 0.30 0.00 - 0.90 %     Nucleated RBC 0.00 /100 WBC    Neutrophils (Absolute) 5.85 1.82 - 7.42 K/uL    Lymphs (Absolute) 1.22 1.00 - 4.80 K/uL    Monos (Absolute) 0.60 0.00 - 0.85 K/uL    Eos (Absolute) 0.26 0.00 - 0.51 K/uL    Baso (Absolute) 0.03 0.00 - 0.12 K/uL    Immature Granulocytes (abs) 0.02 0.00 - 0.11 K/uL    NRBC (Absolute) 0.00 K/uL   COMP METABOLIC PANEL   Result Value Ref Range    Sodium 138 135 - 145 mmol/L    Potassium 3.8 3.6 - 5.5 mmol/L    Chloride 104 96 - 112 mmol/L    Co2 24 20 - 33 mmol/L    Anion Gap 10.0 0.0 - 11.9    Glucose 88 65 - 99 mg/dL    Bun 7 (L) 8 - 22 mg/dL    Creatinine 1.16 0.50 - 1.40 mg/dL    Calcium 10.3 8.5 - 10.5 mg/dL    AST(SGOT) 20 12 - 45 U/L    ALT(SGPT) 12 2 - 50 U/L    Alkaline Phosphatase 59 30 - 99 U/L    Total Bilirubin 0.5 0.1 - 1.5 mg/dL    Albumin 4.5 3.2 - 4.9 g/dL    Total Protein 7.4 6.0 - 8.2 g/dL    Globulin 2.9 1.9 - 3.5 g/dL    A-G Ratio 1.6 g/dL   LIPASE   Result Value Ref Range    Lipase 15 11 - 82 U/L   ESTIMATED GFR   Result Value Ref Range    GFR If African American >60 >60 mL/min/1.73 m 2    GFR If Non African American >60 >60 mL/min/1.73 m 2   BLOOD CULTURE,HOLD   Result Value Ref Range    Blood Culture Hold Collected    INFLUENZA A/B BY PCR   Result Value Ref Range    Influenza virus A RNA Negative Negative    Influenza virus B, PCR Negative Negative      All labs were reviewed by me.    RADIOLOGY  DX-CHEST-2 VIEWS   Final Result      No radiographic evidence of acute cardiopulmonary process.        The radiologist's interpretation of all radiological studies and images have been reviewed by me.    COURSE & MEDICAL DECISION MAKING  Pertinent Labs & Imaging studies reviewed. (See chart for details)    Differential diagnoses include but are not limited to: Influenza, strep throat, pneumonia.      10:35 PM - Patient seen and examined at bedside. Patient will be treated with Toradol 15 mg IV, Tylenol 650 mg PO. Ordered Chest X Ray, influenza, beta strep, GFR, blood  culture, CBC, CMP, lipase to evaluate his symptoms. The patient was resuscitated with IV fluids for dehydration.     Decision Making:  This is a 27 y.o. year old male who presents with fever tachycardia. Workup is essentially unremarkable he is a normal white blood cell count with minimal left shift. Rapid strep is negative CMP is negative influenza is negative chest x-ray is negative. He was given IV fluids for presumed dehydration he felt better after this and Toradol. His temperature improved. I do think this is likely related to viral illness. He is otherwise stable and he'll be discharged.      DISPOSITION:  Patient will be discharged home in stable condition.    FOLLOW UP:  [facility], Emergency Dept  [address] [id number] [phone]    Return for worsening shortness of breath, fevers or other concerns    FINAL IMPRESSION  1. Fever, unspecified fever cause    2. Upper respiratory tract infection, unspecified type          I, Elmer Sanchez (Scribe), am scribing for, and in the presence of, Daja Haines M.D..    Electronically signed by: Elmer Sanchez (Scribe), 12/6/2017    IDaja M.D. personally performed the services described in this documentation, as scribed by Elmer Sanchez in my presence, and it is both accurate and complete.    The note accurately reflects work and decisions made by me.  Daja Haines  12/7/2017  2:36 AM

## 2017-12-07 NOTE — ED NOTES
Chief Complaint   Patient presents with   • Diarrhea     x 3 days   • Cough   • Nausea   • Fever   • Sore Throat     Explained to pt triage process, made pt aware to tell this RN of any changes/concerns, pt verbalized understanding of process and instructions given. Pt to RAJ rodríguez.

## 2017-12-07 NOTE — ED NOTES
Pt ambulatory to triage, protocol initiated, tylenol given per ERP order. Labs sent, throat swab sent

## 2017-12-07 NOTE — ED NOTES
Pt ambulated to [facility]. Pt changed into gown and placed on monitor.   Agree with triage note. Pt states his wife has also been sick since Saturday with similar symptoms.   Chart up and ready for ERP now.

## 2017-12-08 LAB
S PYO SPEC QL CULT: NORMAL
SIGNIFICANT IND 70042: NORMAL
SITE SITE: NORMAL
SOURCE SOURCE: NORMAL

## 2018-03-04 ENCOUNTER — HOSPITAL ENCOUNTER (EMERGENCY)
Dept: HOSPITAL 8 - ED | Age: 28
End: 2018-03-04
Payer: SELF-PAY

## 2018-03-04 VITALS — DIASTOLIC BLOOD PRESSURE: 75 MMHG | SYSTOLIC BLOOD PRESSURE: 125 MMHG

## 2018-03-04 VITALS — HEIGHT: 71 IN | WEIGHT: 177.69 LBS | BODY MASS INDEX: 24.88 KG/M2

## 2018-03-04 DIAGNOSIS — G43.909: Primary | ICD-10-CM

## 2018-03-04 PROCEDURE — 99284 EMERGENCY DEPT VISIT MOD MDM: CPT

## 2018-03-04 PROCEDURE — 70450 CT HEAD/BRAIN W/O DYE: CPT

## 2018-03-04 PROCEDURE — 96372 THER/PROPH/DIAG INJ SC/IM: CPT

## 2019-03-25 ENCOUNTER — HOSPITAL ENCOUNTER (EMERGENCY)
Dept: HOSPITAL 8 - ED | Age: 29
Discharge: HOME | End: 2019-03-25
Payer: COMMERCIAL

## 2019-03-25 VITALS
DIASTOLIC BLOOD PRESSURE: 83 MMHG | BODY MASS INDEX: 28.89 KG/M2 | WEIGHT: 206.35 LBS | SYSTOLIC BLOOD PRESSURE: 137 MMHG | HEIGHT: 71 IN
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
## 2019-03-25 DIAGNOSIS — S39.012A: Primary | ICD-10-CM
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
## 2019-03-25 PROCEDURE — 99283 EMERGENCY DEPT VISIT LOW MDM: CPT

## 2019-03-25 PROCEDURE — 96372 THER/PROPH/DIAG INJ SC/IM: CPT

## 2019-03-25 PROCEDURE — 72110 X-RAY EXAM L-2 SPINE 4/>VWS: CPT

## 2019-03-25 NOTE — NUR
RECEIVED BEDSIDE REPORT FROM [facility]ALI OLMOS. PT RESTING ON GURNEY. STATES HE HAS 
SOME BACK PAIN. NO ACUTE DISTRESS NOTED. NO NEEDS REQUESTED AT THIS TIME.